# Patient Record
(demographics unavailable — no encounter records)

---

## 2025-04-22 NOTE — HISTORY OF PRESENT ILLNESS
[FreeTextEntry1] : ALISSON JAMA is a 47 year old male here for a physical exam. [de-identified] : His last physical exam was last year  Vaccines: Tetanus is up to date, 4/9/24  His last dentist visit was less than one year ago  His last eye doctor appointment was years ago His last dermatologist visit was years ago  Colon cancer screening is up to date, colonoscopy 9/24/24, Dr. Nieves, repeat 10 years  His diet is healthy overall Exercise: rarely

## 2025-04-22 NOTE — PLAN
[FreeTextEntry1] : Continue all medications as prescribed. Check labs as above. Will include TSH and testosterone due to fatigue and feeling cold. Will adjust any medications based upon lab results.  He will discuss testing for the SCA 27B variant with his mother's specialist.  Reviewed age-appropriate preventive screening tests with patient.  Discussed clean eating (eg Mediterranean style eating plan) and regular exercise/staying as physically active as possible.  Include balance exercises and strength training and core strengthening exercises for bone health and to decrease risk for falls.  Reviewed importance of good self care (e.g. meditation, yoga, adequate rest, regular exercise, magnesium, clean eating, etc.).  Follow up for next physical in one year.

## 2025-04-22 NOTE — ASSESSMENT
[Vaccines Reviewed] : Immunizations reviewed today. Please see immunization details in the vaccine log within the immunization flowsheet.  [FreeTextEntry1] : ALISSON JAMA is a 47 year old male here for a physical exam.  He has a history of hypercholesterolemia and an elevated coronary artery calcium score. He has a family history of CAD in his father and grandfather.  He sees a cardiologist (Dr. Turner) regularly and does not need an EKG today. He is considering changing cardiologists. He is thinking of seeing Dr. Eliezer Kwok.  He had an episode of chest pain in 2/2025. He went to the ER where he was ruled out for MI. He saw Dr. Turner the next day and he ordered a CT angiogram. This showed a lower amount of calcium than was seen on his previous CT heart calcium scan, though he has been taking a statin. He feels that Dr. Turner did not explain why this was and he would like a second opinion a out whether he should have additional cardiac testing, especially in light of his family history.  He has had intermittent left sided chest pain since the holidays. He was putting his child in a car seat and feels like he pulled something. He is not clear if this is related to the pain which led him to go to the hospital.  He is concerned that his hands and feet are always cold. He also reports a lack of energy. He admits to a lot of stress at work and admits that he feels depressed at times but does not know that he needs medication for this.  He also wanted to mention that his mother has had dementia for about 20 years. 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 He is wondering if he should get tested for this as well. His mother has an appointment with a specialist for this and they are thinking of asking his opinion as well.

## 2025-04-22 NOTE — HISTORY OF PRESENT ILLNESS
[FreeTextEntry1] : ALISSON JAMA is a 47 year old male here for a physical exam. [de-identified] : His last physical exam was last year  Vaccines: Tetanus is up to date, 4/9/24  His last dentist visit was less than one year ago  His last eye doctor appointment was years ago His last dermatologist visit was years ago  Colon cancer screening is up to date, colonoscopy 9/24/24, Dr. Nieves, repeat 10 years  His diet is healthy overall Exercise: rarely

## 2025-04-22 NOTE — HEALTH RISK ASSESSMENT
[0] : 2) Feeling down, depressed, or hopeless: Not at all (0) [PHQ-2 Negative - No further assessment needed] : PHQ-2 Negative - No further assessment needed [Never] : Never [[id number]] : 0

## 2025-04-22 NOTE — HEALTH RISK ASSESSMENT
[0] : 2) Feeling down, depressed, or hopeless: Not at all (0) [PHQ-2 Negative - No further assessment needed] : PHQ-2 Negative - No further assessment needed [Never] : Never [[id number]] : 0